# Patient Record
Sex: MALE | Race: WHITE | Employment: FULL TIME | URBAN - METROPOLITAN AREA
[De-identification: names, ages, dates, MRNs, and addresses within clinical notes are randomized per-mention and may not be internally consistent; named-entity substitution may affect disease eponyms.]

---

## 2018-08-03 ENCOUNTER — HOSPITAL ENCOUNTER (OUTPATIENT)
Dept: ENDOSCOPY | Age: 63
Discharge: HOME | End: 2018-08-03
Attending: INTERNAL MEDICINE | Admitting: INTERNAL MEDICINE

## 2018-08-03 NOTE — ANESTHESIA PRE-OP
Department of Anesthesiology  Preprocedure Note       Name:  Néstor Echevarria   Age:  59 y.o.  :  1955                                          MRN:  [id number]         Date:  8/3/2018      Surgeon:    Procedure:    Medications prior to admission:   Prior to Admission medications    Medication Sig Start Date End Date Taking? Authorizing Provider   levothyroxine (SYNTHROID) 175 MCG tablet  5/19/15   Historical Provider, MD   topiramate (TOPAMAX) 100 MG tablet  5/19/15   Historical Provider, MD   meloxicam (MOBIC) 15 MG tablet 1 po qday 6/24/15   Samer Praveen Taylor MD       Current medications:    Current Outpatient Prescriptions   Medication Sig Dispense Refill    levothyroxine (SYNTHROID) 175 MCG tablet   1    topiramate (TOPAMAX) 100 MG tablet   1    meloxicam (MOBIC) 15 MG tablet 1 po qday 30 tablet 3     No current facility-administered medications for this encounter. Allergies:  No Known Allergies    Problem List:  There is no problem list on file for this patient. Past Medical History:  No past medical history on file. Past Surgical History:        Procedure Laterality Date    CARPAL TUNNEL RELEASE Left     KNEE SURGERY Left     KNEE SURGERY Right     LUMBAR FUSION  2013    THUMB AMPUTATION      left       Social History:    Social History   Substance Use Topics    Smoking status: Never Smoker    Smokeless tobacco: Never Used    Alcohol use Not on file                                Counseling given: Not Answered      Vital Signs (Current): There were no vitals filed for this visit. BP Readings from Last 3 Encounters:   06/24/15 (!) 161/102       NPO Status:                                                                                 BMI:   Wt Readings from Last 3 Encounters:   06/24/15 240 lb (108.9 kg)     There is no height or weight on file to calculate BMI.     Anesthesia Evaluation  Nursing notes reviewed  Airway: Mallampati:

## 2018-08-03 NOTE — ANESTHESIA POST-OP
Anesthesia Post-op Note    Patient: Angela Lord  MRN: [id number]  YOB: 1955  Date of evaluation: 8/3/2018  Time:  12:27 PM     Procedure(s) Performed:     Last Vitals: There were no vitals taken for this visit.     Hannah Phase I:      Hannah Phase II:      Anesthesia Post Evaluation    Final anesthesia type: MAC and TIVA  Patient location during evaluation: outpatient unit  Level of consciousness: awake and alert  Hydration status: stable        Paulina Mak MD  12:27 PM

## 2019-08-02 ENCOUNTER — OFFICE VISIT (OUTPATIENT)
Dept: ORTHOPEDIC SURGERY | Age: 64
End: 2019-08-02
Payer: COMMERCIAL

## 2019-08-02 VITALS
SYSTOLIC BLOOD PRESSURE: 134 MMHG | BODY MASS INDEX: 35.79 KG/M2 | WEIGHT: 250 LBS | HEIGHT: 70 IN | DIASTOLIC BLOOD PRESSURE: 87 MMHG | HEART RATE: 69 BPM

## 2019-08-02 DIAGNOSIS — M17.11 OSTEOARTHRITIS OF RIGHT KNEE, UNSPECIFIED OSTEOARTHRITIS TYPE: Primary | ICD-10-CM

## 2019-08-02 DIAGNOSIS — M25.561 RIGHT KNEE PAIN, UNSPECIFIED CHRONICITY: ICD-10-CM

## 2019-08-02 PROCEDURE — 99204 OFFICE O/P NEW MOD 45 MIN: CPT | Performed by: ORTHOPAEDIC SURGERY

## 2019-08-02 NOTE — PROGRESS NOTES
Date:  2019    Name:  Ericka Mc  Address:  [address] [place] [address] [facility] [address]    :  1955      Age:   62 y.o.    SSN:  [id number]      Medical Record Number:  [id number]    Reason for Visit:    Chief Complaint    Knee Pain (np right knee. chronic pain . no new injury)      DOS:2019     HPI: Beverly Cabrera is a 62 y.o. male here today for evaluation of right knee. Comes in with chronic, progressively worsening knee pain for years with a history of bilateral knee scopes years ago. No associated injuries. The right knee pain is constantly achy, wakes him up at night, and disrupts his daily activities. Aggravating factors include going downhill and down stairs. He has been taking 3-5 tabs of ibuprofen per dose when his knee pain flares and has been doing activity modification. As an aside, he also had a left knee scope years ago and at present is coping with back problems. Pain Assessment  Location of Pain: Knee  Location Modifiers: Right  Severity of Pain: 3  Quality of Pain: Aching  Duration of Pain: Persistent  Frequency of Pain: Constant  Aggravating Factors: Stairs, Walking  Limiting Behavior: Yes  Relieving Factors: Rest  Result of Injury: No  Work-Related Injury: No  Are there other pain locations you wish to document?: No  ROS: Review of systems reviewed from Patient History Form completed today and available in the patient's chart under the Media tab. History reviewed. No pertinent past medical history. Past Surgical History:   Procedure Laterality Date    CARPAL TUNNEL RELEASE Left     KNEE SURGERY Left     KNEE SURGERY Right     LUMBAR FUSION  2013    THUMB AMPUTATION      left       History reviewed. No pertinent family history.     Social History     Socioeconomic History    Marital status:      Spouse name: None    Number of children: None    Years of education: None    Highest education level: None   Occupational History    None   Social All efforts were made to ensure that this office note is accurate.

## 2019-08-12 ENCOUNTER — HOSPITAL ENCOUNTER (OUTPATIENT)
Dept: PHYSICAL THERAPY | Age: 64
Setting detail: THERAPIES SERIES
Discharge: HOME | End: 2019-08-12
Payer: COMMERCIAL

## 2019-08-12 PROCEDURE — 97161 PT EVAL LOW COMPLEX 20 MIN: CPT

## 2019-08-12 PROCEDURE — 97140 MANUAL THERAPY 1/> REGIONS: CPT

## 2019-08-12 PROCEDURE — 97110 THERAPEUTIC EXERCISES: CPT

## 2019-08-12 NOTE — PLAN OF CARE
[facility]  [address] [place] [address]  Phone [phone]   Fax [phone]                                                       Physical Therapy Certification    Dear Referring Practitioner: Russell Dennis PA-C,    We had the pleasure of evaluating the following patient for physical therapy services at [address] [place] [address]. A summary of our findings can be found in the initial assessment below. This includes our plan of care. If you have any questions or concerns regarding these findings, please do not hesitate to contact me at the office phone number checked above.   Thank you for the referral.       Physician Signature:_______________________________Date:__________________  By signing above (or electronic signature), therapists plan is approved by physician      Patient: Meng Calderon   : 1955   MRN: [id number]  Referring Physician: Referring Practitioner: Russell Dennis PA-C      Evaluation Date: 2019      Medical Diagnosis Information:  Diagnosis: R knee pain M25.561   Treatment Diagnosis: R knee pain                                          Insurance information: PT Insurance Information: [place], deductible met, 80/20 co-insurance, 20 OP visits     Precautions/ Contra-indications:   Latex Allergy:  [x]NO      []YES  Preferred Language for Healthcare:   [x]English       []other:    SUBJECTIVE: Patient stated complaint: see body chart    Relevant Medical History:  Functional Disability Index: LEFS 30% disability    Pain Scale: see body chart  Easing factors: see body chart  Provocative factors: see body chart     Type: []Constant   []Intermittent  []Radiating []Localized []other:     Numbness/Tingling: see body chart    Occupation/School:see body chart     Living Status/Prior Level of Function: Independent with ADLs and IADLs, see body chart    OBJECTIVE:     ROM LEFT RIGHT   HIP Flex [facility] WFL   HIP Abd     HIP Ext limited limited   HIP IR 15 15   HIP ER [facility] WFL   Knee ext [facility] -1*with OP   Knee Flex [facility] WFL   Ankle PF     Ankle DF     Ankle In     Ankle Ev     Strength  LEFT RIGHT   HIP Flexors 4+ 4+   HIP Abductors 5 5   HIP Ext 4 4   Hip ER     Knee EXT (quad) 5 4+   Knee Flex (HS) 5 5   Ankle DF     Ankle PF     Ankle Inv     Ankle EV          Circumference  Mid apex  7 cm prox             Reflexes/Sensation:    [x]Dermatomes/Myotomes intact    [x]Reflexes equal and normal bilaterally   []Other:    Joint mobility: hypo patellar glides all directions on the R   []Normal    [x]Hypo   []Hyper    Palpation: tender medial knee joint line    Functional Mobility/Transfers: squats with good depth, but pain at bottom of squat, difficulty returning to standing. SLS normal B    Posture: slight genu varum    Bandages/Dressings/Incisions: n/a    Gait: (include devices/WB status)  Antalgic gait 2/2 right foot plantar fasciitis    Orthopedic Special Tests: deferred                       [x] Patient history, allergies, meds reviewed. Medical chart reviewed. See intake form. Review Of Systems (ROS):  [x]Performed Review of systems (Integumentary, CardioPulmonary, Neurological) by intake and observation. Intake form has been scanned into medical record. Patient has been instructed to contact their primary care physician regarding ROS issues if not already being addressed at this time.       Co-morbidities/Complexities (which will affect course of rehabilitation):   [x]None           Arthritic conditions   []Rheumatoid arthritis (M05.9)  []Osteoarthritis (M19.91)   Cardiovascular conditions   []Hypertension (I10)  []Hyperlipidemia (E78.5)  []Angina pectoris (I20)  []Atherosclerosis (I70)   Musculoskeletal conditions   []Disc pathology   []Congenital spine pathologies   []Prior surgical intervention  []Osteoporosis (M81.8)  []Osteopenia (M85.8)   Endocrine presentation with changing characteristics  [] unstable and unpredictable characteristics;   [x] Clinical decision making of [x] low, [] moderate, [] high complexity using standardized patient assessment instrument and/or measurable assessment of functional outcome. [x] EVAL (LOW) 92337 (typically 30 minutes face-to-face)  [] EVAL (MOD) 29435 (typically 30 minutes face-to-face)  [] EVAL (HIGH) 30141 (typically 45 minutes face-to-face)  [] RE-EVAL     PLAN:   Frequency/Duration:  1-2 days per week for 4 Weeks:  Interventions:  [x]  Therapeutic exercise including: strength training, ROM, for Lower extremity and core   [x]  NMR activation and proprioception for LE, Glutes and Core   [x]  Manual therapy as indicated for LE, Hip and spine to include: Dry Needling/IASTM, STM, PROM, Gr I-IV mobilizations, manipulation. [x] Modalities as needed that may include: thermal agents, E-stim, Biofeedback, US, iontophoresis as indicated  [x] Patient education on joint protection, postural re-education, activity modification, progression of HEP. HEP instruction: Patient instructed in, and demonstrated proper form of, exercises. Copy of exercises scanned into media file  (see scanned forms)    GOALS:  Patient stated goal: learn HEP for strengthening    Therapist goals for Patient:   Short Term Goals: To be achieved in: 2 weeks  1. Independent in HEP and progression per patient tolerance, in order to prevent re-injury. 2. Patient will have a decrease in pain to facilitate improvement in movement, function, and ADLs as indicated by Functional Deficits. Long Term Goals: To be achieved in: 4 weeks  1. Disability index score of 16% or less for the LEFS to assist with reaching prior level of function. 2. Patient will demonstrate increased AROM to WNL to allow for proper joint functioning as indicated by patients Functional Deficits.    3. Patient will demonstrate an increase in Strength to good proximal hip strength and

## 2019-08-12 NOTE — FLOWSHEET NOTE
[facility]    Physical Therapy Daily Treatment Note  Date:  2019    Patient Name:  Ericka Mc    :  1955  MRN: [id number]  Restrictions/Precautions:    Medical/Treatment Diagnosis Information:  · Diagnosis: R knee pain M25.561  · Treatment Diagnosis: R knee pain   Insurance/Certification information:  PT Insurance Information: [place], deductible met, 80/20 co-insurance, 20 OP visits  Physician Information:  Referring Practitioner: Corrinne Moos PA-C  Plan of care signed (Y/N):     Date of Patient follow up with Physician:     G-Code (if applicable):      Date G-Code Applied:         Progress Note: [x]  Yes  []  No  Next due by: Visit #10       Latex Allergy:  [x]NO      []YES  Preferred Language for Healthcare:   [x]English       []other:    Visit # Insurance Allowable   1 20     Pain level:  7-8/10     SUBJECTIVE:  See eval    OBJECTIVE: See eval   Observation:    Test measurements:      RESTRICTIONS/PRECAUTIONS: *squatting, walking downhill, descending stairs    Exercises/Interventions:     Therapeutic Ex  15' Resistance Sets/sec Reps Notes   Retro Stepper/BIKE       SLR  3 10    bridging  3 10    HS stretch with strap  30\" 3           Step up  2 10    gastroc stretch  30\" 2                                                                   Manual Intervention 10'       Knee mobs/PROM       Tib/Fem Mobs       Patella Mobs  10 min     Ankle mobs                     NMR re-education       [place]/Biofeedback 10/10       G. Med activaiton/sidelying       G. Max Activation/prone       Hip Ext full ROM G.  Activation       Bosu Bal and Prop- G Med       Single leg stance/Balance/Prop       Bosu Retro G. Med act                         Therapeutic Exercise and NMR EXR  [x] (16600) Provided verbal/tactile cueing for activities related to strengthening, flexibility, endurance, ROM for improvements in LE, proximal hip, and core control with self care, mobility, lifting, ambulation.  [] (66900) Provided verbal/tactile cueing for activities related to improving balance, coordination, kinesthetic sense, posture, motor skill, proprioception  to assist with LE, proximal hip, and core control in self care, mobility, lifting, ambulation and eccentric single leg control. NMR and Therapeutic Activities:    [x] (66917 or 68968) Provided verbal/tactile cueing for activities related to improving balance, coordination, kinesthetic sense, posture, motor skill, proprioception and motor activation to allow for proper function of core, proximal hip and LE with self care and ADLs  [] (63796) Gait Re-education- Provided training and instruction to the patient for proper LE, core and proximal hip recruitment and positioning and eccentric body weight control with ambulation re-education including up and down stairs     Home Exercise Program:    [x] (32009) Reviewed/Progressed HEP activities related to strengthening, flexibility, endurance, ROM of core, proximal hip and LE for functional self-care, mobility, lifting and ambulation/stair navigation   [] (00009)Reviewed/Progressed HEP activities related to improving balance, coordination, kinesthetic sense, posture, motor skill, proprioception of core, proximal hip and LE for self care, mobility, lifting, and ambulation/stair navigation      Manual Treatments:  PROM / STM / Oscillations-Mobs:  G-I, II, III, IV (PA's, Inf., Post.)  [x] (90984) Provided manual therapy to mobilize LE, proximal hip and/or LS spine soft tissue/joints for the purpose of modulating pain, promoting relaxation,  increasing ROM, reducing/eliminating soft tissue swelling/inflammation/restriction, improving soft tissue extensibility and allowing for proper ROM for normal function with self care, mobility, lifting and ambulation.      Modalities: declined     Charges:  Timed Code Treatment Minutes: eval +25   Total Treatment Minutes: 65     [x] visit [] Discharge    Electronically signed by:  Dora Rodrigues PT

## 2019-08-19 ENCOUNTER — HOSPITAL ENCOUNTER (OUTPATIENT)
Dept: PHYSICAL THERAPY | Age: 64
Setting detail: THERAPIES SERIES
Discharge: HOME | End: 2019-08-19
Payer: COMMERCIAL

## 2019-08-19 PROCEDURE — 97110 THERAPEUTIC EXERCISES: CPT

## 2019-08-19 PROCEDURE — 97140 MANUAL THERAPY 1/> REGIONS: CPT

## 2019-08-26 ENCOUNTER — HOSPITAL ENCOUNTER (OUTPATIENT)
Dept: PHYSICAL THERAPY | Age: 64
Setting detail: THERAPIES SERIES
Discharge: HOME | End: 2019-08-26
Payer: COMMERCIAL

## 2019-08-26 PROCEDURE — 97140 MANUAL THERAPY 1/> REGIONS: CPT

## 2019-08-26 PROCEDURE — 97110 THERAPEUTIC EXERCISES: CPT

## 2019-08-26 NOTE — FLOWSHEET NOTE
allowing for proper ROM for normal function with self care, mobility, lifting and ambulation. Modalities: declined     Charges:  Timed Code Treatment Minutes: 40   Total Treatment Minutes: 55     [] EVAL  [x] AM(19502) x 2   [] IONTO  [] NMR (18787) x     [] VASO  [x] Manual (23584) x    1  [] Other:  [] TA x      [] Mech Traction (18721)  [] ES(attended) (01684)      [] ES (un) (00033):     GOALS:   Patient stated goal: learn HEP for strengthening    Therapist goals for Patient:   Short Term Goals: To be achieved in: 2 weeks  1. Independent in HEP and progression per patient tolerance, in order to prevent re-injury. 2. Patient will have a decrease in pain to facilitate improvement in movement, function, and ADLs as indicated by Functional Deficits. Long Term Goals: To be achieved in: 4 weeks  1. Disability index score of 16% or less for the LEFS to assist with reaching prior level of function. 2. Patient will demonstrate increased AROM to WNL to allow for proper joint functioning as indicated by patients Functional Deficits. 3. Patient will demonstrate an increase in Strength to good proximal hip strength and control, within 5lb HHD in LE to allow for proper functional mobility as indicated by patients Functional Deficits. 4. Patient will return to walking/hiking activities without increased symptoms or restriction. 5. Pt will descend stairs without increased symptoms    Progression Towards Functional goals:  [] Patient is progressing as expected towards functional goals listed. [] Progression is slowed due to complexities listed. [] Progression has been slowed due to co-morbidities. [x] Plan just implemented, too soon to assess goals progression  [] Other:     ASSESSMENT:  Pt continues to tolerate loading well for the knee, but requires VC to avoid increased patellofemoral compression, especially with ankle DF limitations.     Treatment/Activity Tolerance:  [x] Patient tolerated treatment

## 2019-08-28 ENCOUNTER — TELEPHONE (OUTPATIENT)
Dept: ORTHOPEDIC SURGERY | Age: 64
End: 2019-08-28

## 2019-12-18 ENCOUNTER — OFFICE VISIT (OUTPATIENT)
Dept: SURGERY | Age: 64
End: 2019-12-18
Payer: COMMERCIAL

## 2019-12-18 VITALS — BODY MASS INDEX: 34.01 KG/M2 | SYSTOLIC BLOOD PRESSURE: 124 MMHG | WEIGHT: 237 LBS | DIASTOLIC BLOOD PRESSURE: 82 MMHG

## 2019-12-18 DIAGNOSIS — D17.1 LIPOMA OF TORSO: Primary | ICD-10-CM

## 2019-12-18 PROCEDURE — 99242 OFF/OP CONSLTJ NEW/EST SF 20: CPT | Performed by: SURGERY

## 2019-12-18 ASSESSMENT — ENCOUNTER SYMPTOMS
ALLERGIC/IMMUNOLOGIC NEGATIVE: 1
APNEA: 1
GASTROINTESTINAL NEGATIVE: 1
EYES NEGATIVE: 1

## 2019-12-26 ENCOUNTER — PROCEDURE VISIT (OUTPATIENT)
Dept: SURGERY | Age: 64
End: 2019-12-26
Payer: COMMERCIAL

## 2019-12-26 VITALS — BODY MASS INDEX: 34.01 KG/M2 | DIASTOLIC BLOOD PRESSURE: 80 MMHG | SYSTOLIC BLOOD PRESSURE: 124 MMHG | WEIGHT: 237 LBS

## 2019-12-26 DIAGNOSIS — D17.24 LIPOMA OF LEFT LOWER EXTREMITY: Primary | ICD-10-CM

## 2019-12-26 DIAGNOSIS — D17.1 LIPOMA OF TORSO: ICD-10-CM

## 2019-12-26 PROCEDURE — 21555 EXC NECK LES SC < 3 CM: CPT | Performed by: SURGERY

## 2019-12-26 PROCEDURE — 27327 EXC THIGH/KNEE LES SC < 3 CM: CPT | Performed by: SURGERY

## 2020-01-08 ENCOUNTER — OFFICE VISIT (OUTPATIENT)
Dept: SURGERY | Age: 65
End: 2020-01-08

## 2020-01-08 VITALS — DIASTOLIC BLOOD PRESSURE: 82 MMHG | BODY MASS INDEX: 34.01 KG/M2 | WEIGHT: 237 LBS | SYSTOLIC BLOOD PRESSURE: 124 MMHG

## 2020-01-08 PROCEDURE — 99024 POSTOP FOLLOW-UP VISIT: CPT | Performed by: SURGERY

## 2022-10-03 ENCOUNTER — OFFICE VISIT (OUTPATIENT)
Dept: VASCULAR SURGERY | Age: 67
End: 2022-10-03
Payer: MEDICARE

## 2022-10-03 VITALS
DIASTOLIC BLOOD PRESSURE: 88 MMHG | BODY MASS INDEX: 35.93 KG/M2 | SYSTOLIC BLOOD PRESSURE: 140 MMHG | HEIGHT: 70 IN | WEIGHT: 251 LBS

## 2022-10-03 DIAGNOSIS — I87.2 VENOUS INSUFFICIENCY (CHRONIC) (PERIPHERAL): Primary | ICD-10-CM

## 2022-10-03 PROCEDURE — G8484 FLU IMMUNIZE NO ADMIN: HCPCS | Performed by: SURGERY

## 2022-10-03 PROCEDURE — 99203 OFFICE O/P NEW LOW 30 MIN: CPT | Performed by: SURGERY

## 2022-10-03 PROCEDURE — 3017F COLORECTAL CA SCREEN DOC REV: CPT | Performed by: SURGERY

## 2022-10-03 PROCEDURE — 1123F ACP DISCUSS/DSCN MKR DOCD: CPT | Performed by: SURGERY

## 2022-10-03 PROCEDURE — G8427 DOCREV CUR MEDS BY ELIG CLIN: HCPCS | Performed by: SURGERY

## 2022-10-03 PROCEDURE — 4004F PT TOBACCO SCREEN RCVD TLK: CPT | Performed by: SURGERY

## 2022-10-03 PROCEDURE — G8417 CALC BMI ABV UP PARAM F/U: HCPCS | Performed by: SURGERY

## 2022-10-03 RX ORDER — M-VIT,TX,IRON,MINS/CALC/FOLIC 27MG-0.4MG
1 TABLET ORAL DAILY
COMMUNITY

## 2022-10-03 NOTE — LETTER
[facility]) - Vascular and Endovascular Surgeons  [facility] [address] [facility]  Phone: [phone]  Fax: [phone]    Piero Osorio MD    October 3, 2022     MD Dandre Baker [address]    Patient: Kim Pitts   MR Number: [id number]   YOB: 1955   Date of Visit: 10/3/2022       Dear Brenda Abarca: Thank you for referring Carlita Nina to me for evaluation/treatment. Below are the relevant portions of my assessment and plan of care. If you have questions, please do not hesitate to call me. I look forward to following Adam along with you.     Sincerely,      Piero Osorio MD

## 2022-10-03 NOTE — PROGRESS NOTES
[facility]  Consultation Note    Chief Complaint / Reason for Consultation  Leg pain    History of Present Illness  Patient is a 78 y.o. male with remote history of deep vein thrombosis though unable to specify which extremity. He has had 2 episodes of significant pain involving his right leg. The first 1 in June and the second 1 in early September. The 1 in September was associated with a fishing trip where he was walking on uneven ground and did have pain for the 2 weeks following. He does have knee-high 20-30 support stockings. Based on the most recent duplex he was placed on a 21-day course of anticoagulation. Does complain of heaviness. States the pain in his right leg has significantly improved. No other functional limitations. Review of Systems     Denies fevers, chills, chest pain, shortness of breath, nausea, vomiting, hematemesis, diarrhea, constipation, melena, hematochezia, wt changes, vision problems, blindness, hearing problems, facial droop, slurred speech, extremity weakness, extremity numbness, dysuria. Past Medical History:   has no past medical history on file. Past Surgical History:   has a past surgical history that includes knee surgery (Left); knee surgery (Right); Thumb amputation; Carpal tunnel release (Left); and lumbar fusion (12/2013). Medications:  Current Outpatient Medications on File Prior to Visit   Medication Sig Dispense Refill    Tamsulosin HCl (FLOMAX PO) Take by mouth      OMEPRAZOLE PO Take by mouth      levothyroxine (SYNTHROID) 175 MCG tablet   1    topiramate (TOPAMAX) 100 MG tablet   1    meloxicam (MOBIC) 15 MG tablet 1 po qday 30 tablet 3     No current facility-administered medications on file prior to visit. Allergies:  No Known Allergies     Social History:   reports that he has never smoked. He has never used smokeless tobacco. He reports current alcohol use. He reports that he does not use drugs.      Family History:  family history includes Cancer in his father and mother. Vital Signs  There were no vitals filed for this visit. Physical Examination  General:  no apparent distress  Psychiatric: affect appropriate  Head/Eyes/Ears/Nose/Throat:  Atraumatic, vision and hearing intact, face symmetric  Neck:  supple  Chest/Lungs: clear to auscultation bilaterally  Cardiac:  Regular rate and rhythm  Abdomen: soft, nontender  Extremities: warm and well perfused  Varicose veins over the right lateral and posterior calf  - bilateral upper extremity motorsensory intact  - bilateral lower extremity motorsensory intact  Vascular exam:  2+      Labs  No results found for: WBC, HGB, HCT, MCV, PLT  No results found for: NA, K, CL, CO2, PHOS, BUN, CREATININE, CA   No results found for: GLU    Imaging:     OSH:      1. No sonographic evidence of right lower extremity DVT. 2. Superficial thrombophlebitis involving the peroneal vein at the trifurcation. o No evidence of acute deep vein thrombosis in the bilateral lower        extremities. o No evidence of acute superficial vein thrombosis in the bilateral lower        extremities. o There are chronic post thrombotic changes in the bilateral lower        extremities as described above. No orders to display     No results found. Assessment:   Chronic venous insufficiency  Right leg pain that is likely multifactorial      Plan:  Continue to stress the importance of knee-high 20 to 30 mmHg support stockings and compliance. I do not feel that all of his pain is related to venous insufficiency and is more likely related to some underlying musculoskeletal issue from his recent trip. I do think he would benefit from a formal venous reflux evaluation and consideration of possible further intervention. We will refer to our [facility] office for follow-up      Richard B. Valentino Pollard M.D., FACS.   10/3/2022  8:24 AM

## 2022-10-25 ENCOUNTER — OFFICE VISIT (OUTPATIENT)
Dept: ORTHOPEDIC SURGERY | Age: 67
End: 2022-10-25
Payer: MEDICARE

## 2022-10-25 VITALS — HEIGHT: 70 IN | WEIGHT: 251 LBS | BODY MASS INDEX: 35.93 KG/M2

## 2022-10-25 DIAGNOSIS — M17.0 PRIMARY OSTEOARTHRITIS OF BOTH KNEES: Primary | ICD-10-CM

## 2022-10-25 DIAGNOSIS — S84.11XA INJURY OF RIGHT PERONEAL NERVE, INITIAL ENCOUNTER: ICD-10-CM

## 2022-10-25 PROCEDURE — 3017F COLORECTAL CA SCREEN DOC REV: CPT | Performed by: ORTHOPAEDIC SURGERY

## 2022-10-25 PROCEDURE — G8427 DOCREV CUR MEDS BY ELIG CLIN: HCPCS | Performed by: ORTHOPAEDIC SURGERY

## 2022-10-25 PROCEDURE — 1036F TOBACCO NON-USER: CPT | Performed by: ORTHOPAEDIC SURGERY

## 2022-10-25 PROCEDURE — 20610 DRAIN/INJ JOINT/BURSA W/O US: CPT | Performed by: ORTHOPAEDIC SURGERY

## 2022-10-25 PROCEDURE — G8417 CALC BMI ABV UP PARAM F/U: HCPCS | Performed by: ORTHOPAEDIC SURGERY

## 2022-10-25 PROCEDURE — 1123F ACP DISCUSS/DSCN MKR DOCD: CPT | Performed by: ORTHOPAEDIC SURGERY

## 2022-10-25 PROCEDURE — G8484 FLU IMMUNIZE NO ADMIN: HCPCS | Performed by: ORTHOPAEDIC SURGERY

## 2022-10-25 PROCEDURE — 99204 OFFICE O/P NEW MOD 45 MIN: CPT | Performed by: ORTHOPAEDIC SURGERY

## 2022-10-25 RX ORDER — LIDOCAINE HYDROCHLORIDE 10 MG/ML
3 INJECTION, SOLUTION EPIDURAL; INFILTRATION; INTRACAUDAL; PERINEURAL ONCE
Status: COMPLETED | OUTPATIENT
Start: 2022-10-25 | End: 2022-10-25

## 2022-10-25 RX ADMIN — LIDOCAINE HYDROCHLORIDE 3 ML: 10 INJECTION, SOLUTION EPIDURAL; INFILTRATION; INTRACAUDAL; PERINEURAL at 10:34

## 2022-10-25 RX ADMIN — LIDOCAINE HYDROCHLORIDE 3 ML: 10 INJECTION, SOLUTION EPIDURAL; INFILTRATION; INTRACAUDAL; PERINEURAL at 10:35

## 2022-10-25 NOTE — PROGRESS NOTES
12 [facility]  History and Physical      Date:  10/25/2022    Name:  Ros Lam  Address:  [address] [place] [address] [place] [id number]    :  1955      Age:   78 y.o. Chief Complaint    Knee Pain (Right knee pain)      History of Present Illness:  Ros Lam is a 78 y.o. male who presents for bilateral knee pain right worse than left. He has had knee troubles for years. He has had both knees arthroscopically scoped. His last surgery was with Dr. Karl Ovalle with right knee arthroscopic meniscectomy. He right knee showed arthritic changes at time of the surgery. Labor Day weekend he did a lot of hiking on uneven surface with fly fishing and noted acute exacerbation of right knee pain. The knee pain has a component of nerve pain in the peroneal nerve distribution. He has had a multiple back procedures including fusion surgery. He reports the most recent episode of knee pain is unrelated to his back. The shooting pain starts on the outside of his knee. Luckily since the incidence, his nerve pain has gotten better. He has also had a history of blood clot and DVT and he has seen a vascular surgeon recently. He has had two steroid shots in the past which did not give him any relief. Pain Assessment  Location of Pain: Knee  Location Modifiers: Right  Severity of Pain: 3  Quality of Pain: Aching, Sharp  Duration of Pain: A few minutes  Frequency of Pain: Intermittent  Aggravating Factors: Stairs, Walking, Standing, Exercise  Limiting Behavior: Some  Result of Injury: No  Work-Related Injury: No  Are there other pain locations you wish to document?: No    No past medical history on file.      Past Surgical History:   Procedure Laterality Date    CARPAL TUNNEL RELEASE Left     KNEE SURGERY Left     KNEE SURGERY Right     LUMBAR FUSION  2013    THUMB AMPUTATION      left       Family History   Problem Relation Age of Onset    Cancer Mother Cancer Father        Social History     Socioeconomic History    Marital status:      Spouse name: None    Number of children: None    Years of education: None    Highest education level: None   Tobacco Use    Smoking status: Never    Smokeless tobacco: Never   Vaping Use    Vaping Use: Never used   Substance and Sexual Activity    Alcohol use: Yes     Alcohol/week: 0.0 standard drinks     Comment: moderate    Drug use: Never       Current Outpatient Medications   Medication Sig Dispense Refill    Multiple Vitamins-Minerals (THERAPEUTIC MULTIVITAMIN-MINERALS) tablet Take 1 tablet by mouth daily      ZINC-MAGNESIUM ASPART-VIT B6 PO Take by mouth      Glucosamine-Chondroit-Vit C-Mn (GLUCOSAMINE 1500 COMPLEX PO) Take by mouth 2 times daily      rivaroxaban (XARELTO) 15 MG TABS tablet Take 15 mg by mouth      Tamsulosin HCl (FLOMAX PO) Take by mouth      OMEPRAZOLE PO Take 40 mg by mouth      levothyroxine (SYNTHROID) 175 MCG tablet 212 mcg Daily  1    topiramate (TOPAMAX) 100 MG tablet   1    meloxicam (MOBIC) 15 MG tablet 1 po qday 30 tablet 3     No current facility-administered medications for this visit. No Known Allergies      Review of Systems:  A 14 point review of systems was completed by the patient and is available in the media section of the scanned medical record and was reviewed. Vital Signs:   Ht 5' 10\" (1.778 m)   Wt 251 lb (113.9 kg)   BMI 36.01 kg/m²     General Exam:    General: Timmy Priest is a healthy and well appearing 78y.o. year old male who is sitting comfortably in our office in no acute distress. Neuro: Alert & Oriented x 3,  normal,  no focal deficits noted. Normal mood & affect. Eyes: Sclera clear  Ears: Normal external ear  Mouth: Patient wearing a mask  Pulm: Respirations unlabored and regular   Skin: Warm, well perfused      Knee Examination:     Inspection:  Mild effusion, no ecchymosis, discoloration, erythema, excessive warmth.  no gross deformities, no signs of infection. Previous scope incisions well-healed with no complication. Palpation: There is patellofemoral crepitus, there is + joint line tenderness. No other osseous or soft tissue tenderness around the knee. Negative calf tenderness    Positive Tinel of peroneal nerve right around the fibular head. Range of Motion:  0-120 degrees without pain or difficulty    Strength:  5/5 quadriceps, 5/5 hamstrings    Special Tests:  No ligament instability, valgus and varus stress test are stable at 0 and 30°. Lachman's exhibits a solid endpoint. Negative posterior drawer. Negative Homans sign    Gait: Non antalgic, without the use of assistive devices    Alignment: Varus deformity on left. Valgus deformity on right. Neuro: Sensation equal bilateral lower extremities    Vascular: 2+ posterior tibialis pulse    TA strength 4/5 R, 5/5 L    Radiology:   Previously obtain imaging reviewed. No new images obtained. X-rays obtained and reviewed in office: AP and merchant view of both knees and a lateral of the bilateral knee. Impression: No fractures, dislocations, visible tumors, or signs of acute trauma. Patient exhibits severely narrowed joint space in the medial and lateral femoral-tibial compartments bilaterally. Patient has severe wear of patellofemoral joints bilaterally. Osteophytes noted in all 3 compartments of the knee. Office Procedures:  Orders Placed This Encounter   Procedures    MD ARTHROCENTESIS ASPIR&/INJ MAJOR JT/BURSA W/O US         Assessment: Patient is a 78 y.o. male bilateral knee osteoarthritis, right more severe than left both radiographically and clinically. Peroneal nerve stretch injury at the level of fibular head on the right. Encounter Diagnoses   Name Primary?     Primary osteoarthritis of both knees Yes    Injury of right peroneal nerve, initial encounter        Orders Placed This Encounter   Medications    hyaluronate (MONOVISC) injection 88 mg    hyaluronate (MONOVISC) injection 88 mg    lidocaine PF 1 % injection 3 mL    lidocaine PF 1 % injection 3 mL       Medical decision making:  Patient's workup and evaluation were reviewed with the patient in the office today. Imaging reviewed with the patient. Pertinent imaging was reviewed. The etiology, natural history, and treatment options for the disorder were discussed. The roles of activity modification, antiinflammatory medicine, injections, bracing, physical therapy, and surgical interventions were all described to the patient and questions were answered. We believe patient is a candidate for continuing nonoperative treatment. We provided the patient arthritis sheet with all the options. Bilateral Knee Injection Procedure: The indications and risks of steroid injection as well as treatment alternatives were discussed with the patient who consented to the procedure. Under sterile conditions and after informed consent was obtained the patient was given an injection into the bilateral knees. 2cc 40 mg of Monovisc 4cc (22mg)   and 4 mL of 0.5% ropivacaine (Naropin) were placed in the knee after it was prepped with chlorhexidine. This resulted in good relief of symptoms. There were no complications. The patient was advised to ice the knee this evening and to avoid vigorous activities for the next 2 days. They were advised to call us if there was any erythema, enduration, swelling or increasing pain. All the patient's questions were answered while in the clinic. The patient is understanding of all instructions and agrees with the plan. Treatment Plan:    Monovisc knee injection performed today. Activity modification/Rest   Ice 20 minutes ever 1-2 hours PRN  Take medications as prescribed/instructed  Elevation   Lightweight exercise/low impact exercise  Appropriate diet/weight management    We ordered an EMG to evaluate peroneal nerve injury and recovery. Follow up:  Once EMG completes          Nav Tanner MD  [facility] Clinical fellow     10/25/22 5:00 PM    .    This dictation was performed with a verbal recognition program [facility]) and it was checked for errors. It is possible that there are still dictated errors within this office note. If so, please bring any errors to my attention for an addendum. All efforts were made to ensure that this office note is accurate. I have completed and reviewed in detail the history, ROS, PMHx, FMHx, and Shx for this patient. Special mention is made of this patient is still functioning very well with athletic activities. I frankly told him that in terms of his various options he should delay of a total knee replacement at this time. Chief complaint is not advanced enough to warrant the surgical procedure. A comprehensive physical exam was performed which assisted in the establishment of our diagnosis and is accurate to the best of my knowledge. Special mention is made of of his body stature as a very large athletic individual.  This is taking into consideration in terms of the options available to him. .     The medical decision making involves the following diagnosis advanced degenerative osteoarthritis, note is made that he does have a history of deep venous thrombosis and would require 1 surgery is decided that this be taken into account for appropriate prophylaxis. .  The medical decision making involves different treatment options as follows: We discussed in detail the option of conservative treatment realizing that at some point he will have to undergo a total knee replacement however again he is functioning reasonably well even though he is having symptoms. Talked about the nonoperative option of hyaluronic injections which were performed today a prior operative treatment of the medial and lateral meniscus with the subsequent development of osteoarthritis.   Patient was extensively educated on understanding the risks and benefits of the treatment options in particular with a total knee joint replacement he would have to limit all athletic type impact activities which he is currently performing. This patient is in the ( moderate) category of risk due to due to the contemplation and option of future joint replacement surgery in a large individual with underlying deep venous thrombosis. Because of these risks a nonoperative treatment program is favored at this point. This dictation was performed with a verbal recognition program ([employer]) and it was checked for errors. It is possible that there are still dictated errors within this office note. If so, please bring any errors to my attention for an addendum. All efforts were made to ensure that this office note is accurate. Supervising Physician Attestation:  I, Dr. Gabino José, personally performed the services described in this documentation as above, and it is both accurate and complete and I agree with all pertinent clinical information. I personally interviewed the patient and performed a physical examination and medical decision making. I discussed the patient's condition and treatment options and have  reviewed and agree with the past medical, family and social history unless otherwise noted. All of the patient's questions were answered. I personally supervised the Orthopedic and Sportsmedicine Fellow when when noted in the evaluation and treatment plan of the patient.       Board Certified Orthopaedic Surgeon  [address] [facility] and [address] and [address] and [employer]  Professor of [employer]

## 2023-06-01 ENCOUNTER — TELEPHONE (OUTPATIENT)
Dept: ORTHOPEDIC SURGERY | Age: 68
End: 2023-06-01

## 2023-06-01 NOTE — TELEPHONE ENCOUNTER
Other PATIENT CALLED STATES THAT HE HAS RECENTLY HAD XRAYS TAKEN AT [facility] TO REQUEST THEM FOR HIS UPCOMING APPT ON 6/8

## 2023-06-08 ENCOUNTER — OFFICE VISIT (OUTPATIENT)
Dept: ORTHOPEDIC SURGERY | Age: 68
End: 2023-06-08

## 2023-06-08 VITALS — HEIGHT: 70 IN | BODY MASS INDEX: 35.93 KG/M2 | WEIGHT: 251 LBS

## 2023-06-08 DIAGNOSIS — M17.0 PRIMARY OSTEOARTHRITIS OF BOTH KNEES: Primary | ICD-10-CM

## 2023-06-08 RX ORDER — LIDOCAINE HYDROCHLORIDE 10 MG/ML
3 INJECTION, SOLUTION EPIDURAL; INFILTRATION; INTRACAUDAL; PERINEURAL ONCE
Status: COMPLETED | OUTPATIENT
Start: 2023-06-08 | End: 2023-06-08

## 2023-06-08 RX ADMIN — LIDOCAINE HYDROCHLORIDE 3 ML: 10 INJECTION, SOLUTION EPIDURAL; INFILTRATION; INTRACAUDAL; PERINEURAL at 15:42

## 2023-06-08 RX ADMIN — LIDOCAINE HYDROCHLORIDE 3 ML: 10 INJECTION, SOLUTION EPIDURAL; INFILTRATION; INTRACAUDAL; PERINEURAL at 15:43

## 2023-06-08 NOTE — PROGRESS NOTES
Chief Complaint    Knee Pain (Sid knee ck)      History of Present Illness:  Seamus Cantu is a 80 y.o. male who presents for follow up of bilateral knee(s). Mortimer Gerlach has a history having had both knees arthroscopically scoped. His last surgery was with Dr. Yahir Bustamante with right knee arthroscopic meniscectomy. He right knee showed arthritic changes at time of the surgery. He has also had a multiple back procedures including fusion surgery. He has also had a history of blood clot and DVT. He reports that his knee pain is unrelated to his back and that the right knee is worse than the left but that he continually feels sharp shooting pain in both knee. He feels pain the worst when he has been on his feet all day. He is retired but does like to walk up to 3 miles and bike up to [place] for fitness. He reports that sometimes he is able to do these activities with no pain and other times it leaves him unable to walk the next day due to pain and swelling. He takes NSAIDs daily and wears a compression knee sleeve occasionally. Pain Assessment:  Location: bilateral  Level: 4 out of 10  Duration: Months  Description: sharp  Result of an injury: No  Work related injury: No  Aggravating actions: standing and walking  Relieving Factors: OTC medications  Wants injections: Yes     No past medical history on file. Past Surgical History:   Procedure Laterality Date    CARPAL TUNNEL RELEASE Left     KNEE SURGERY Left     KNEE SURGERY Right     LUMBAR FUSION  12/2013    THUMB AMPUTATION      left       Family History   Problem Relation Age of Onset    Cancer Mother     Cancer Father        Social History     Socioeconomic History    Marital status:      Spouse name: None    Number of children: None    Years of education: None    Highest education level: None   Tobacco Use    Smoking status: Never    Smokeless tobacco: Never   Vaping Use    Vaping Use: Never used   Substance and Sexual Activity    Alcohol use:  Yes

## 2024-12-10 PROBLEM — E66.09 CLASS 1 OBESITY DUE TO EXCESS CALORIES WITHOUT SERIOUS COMORBIDITY WITH BODY MASS INDEX (BMI) OF 34.0 TO 34.9 IN ADULT: Chronic | Status: ACTIVE | Noted: 2024-12-10

## 2024-12-10 PROBLEM — E66.811 CLASS 1 OBESITY DUE TO EXCESS CALORIES WITHOUT SERIOUS COMORBIDITY WITH BODY MASS INDEX (BMI) OF 34.0 TO 34.9 IN ADULT: Chronic | Status: ACTIVE | Noted: 2024-12-10

## 2024-12-11 ENCOUNTER — TELEPHONE (OUTPATIENT)
Dept: SLEEP CENTER | Age: 69
End: 2024-12-11

## 2024-12-23 ENCOUNTER — HOSPITAL ENCOUNTER (OUTPATIENT)
Dept: SLEEP CENTER | Age: 69
Discharge: HOME | End: 2024-12-23
Payer: MEDICARE

## 2024-12-23 DIAGNOSIS — G47.33 OBSTRUCTIVE SLEEP APNEA SYNDROME: ICD-10-CM

## 2024-12-23 DIAGNOSIS — G47.33 OBSTRUCTIVE SLEEP APNEA (ADULT) (PEDIATRIC): ICD-10-CM

## 2024-12-23 PROCEDURE — 95810 POLYSOM 6/> YRS 4/> PARAM: CPT

## 2024-12-24 NOTE — PROCEDURES
PROCEDURE NOTE  Date: 12/24/2024   Name: Adam Miranda  YOB: 1955    Procedures                Electronically signed by JESUS FRASER MD on 12/24/2024 at 11:01 AM

## 2025-01-07 ENCOUNTER — TELEPHONE (OUTPATIENT)
Dept: PULMONOLOGY | Age: 70
End: 2025-01-07

## 2025-01-07 NOTE — TELEPHONE ENCOUNTER
Transferred patient to sleep lab to schedule titration study.  Patient said they could not complete the split night due to issues getting the study started.  Please place an order for a titration study.

## 2025-01-07 NOTE — TELEPHONE ENCOUNTER
Left message for patient to call office to go over sleep study results.  Patient will need a titration study scheduled.

## 2025-01-13 ENCOUNTER — HOSPITAL ENCOUNTER (OUTPATIENT)
Dept: SLEEP CENTER | Age: 70
Discharge: HOME | End: 2025-01-13
Payer: MEDICARE

## 2025-01-13 DIAGNOSIS — G47.33 OBSTRUCTIVE SLEEP APNEA SYNDROME: ICD-10-CM

## 2025-01-13 PROCEDURE — 95811 POLYSOM 6/>YRS CPAP 4/> PARM: CPT

## 2025-01-14 ENCOUNTER — TELEPHONE (OUTPATIENT)
Dept: PULMONOLOGY | Age: 70
End: 2025-01-14

## 2025-01-14 NOTE — TELEPHONE ENCOUNTER
Patient is agreeable to having order sent to [facility]. Patient would like order sent to his email as well.

## 2025-01-14 NOTE — PROCEDURES
PROCEDURE NOTE  Date: 1/14/2025   Name: Adam Miranda  YOB: 1955    Procedures                Electronically signed by JESUS FRASER MD on 1/14/25 at 11:53 AM EST

## 2025-01-15 NOTE — TELEPHONE ENCOUNTER
Order, ov, sleep studies, and demographics sent to [facility].  Patient was scheduled for 31-90 day f/u and instructed to call the office if he does not hear from DME in 2 weeks or if he has any issues using machine.